# Patient Record
Sex: FEMALE | Race: WHITE | NOT HISPANIC OR LATINO
[De-identification: names, ages, dates, MRNs, and addresses within clinical notes are randomized per-mention and may not be internally consistent; named-entity substitution may affect disease eponyms.]

---

## 2017-01-09 ENCOUNTER — FORM ENCOUNTER (OUTPATIENT)
Age: 27
End: 2017-01-09

## 2017-02-23 ENCOUNTER — FORM ENCOUNTER (OUTPATIENT)
Age: 27
End: 2017-02-23

## 2017-05-27 ENCOUNTER — TRANSCRIPTION ENCOUNTER (OUTPATIENT)
Age: 27
End: 2017-05-27

## 2017-09-25 ENCOUNTER — FORM ENCOUNTER (OUTPATIENT)
Age: 27
End: 2017-09-25

## 2018-03-30 ENCOUNTER — FORM ENCOUNTER (OUTPATIENT)
Age: 28
End: 2018-03-30

## 2019-05-30 ENCOUNTER — RESULT REVIEW (OUTPATIENT)
Age: 29
End: 2019-05-30

## 2020-06-06 ENCOUNTER — RESULT REVIEW (OUTPATIENT)
Age: 30
End: 2020-06-06

## 2020-09-25 ENCOUNTER — APPOINTMENT (OUTPATIENT)
Dept: UROLOGY | Facility: CLINIC | Age: 30
End: 2020-09-25

## 2021-08-02 ENCOUNTER — EMERGENCY (EMERGENCY)
Facility: HOSPITAL | Age: 31
LOS: 1 days | Discharge: HOME | End: 2021-08-02
Admitting: EMERGENCY MEDICINE
Payer: COMMERCIAL

## 2021-08-02 VITALS
TEMPERATURE: 98 F | HEART RATE: 90 BPM | WEIGHT: 173.94 LBS | RESPIRATION RATE: 18 BRPM | OXYGEN SATURATION: 99 % | DIASTOLIC BLOOD PRESSURE: 108 MMHG | SYSTOLIC BLOOD PRESSURE: 146 MMHG

## 2021-08-02 DIAGNOSIS — T31.0 BURNS INVOLVING LESS THAN 10% OF BODY SURFACE: ICD-10-CM
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
## 2021-08-02 DIAGNOSIS — T23.232A BURN OF SECOND DEGREE OF MULTIPLE LEFT FINGERS (NAIL), NOT INCLUDING THUMB, INITIAL ENCOUNTER: ICD-10-CM

## 2021-08-02 DIAGNOSIS — Z23 ENCOUNTER FOR IMMUNIZATION: ICD-10-CM
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
## 2021-08-02 PROCEDURE — 90471 IMMUNIZATION ADMIN: CPT

## 2021-08-02 PROCEDURE — 99284 EMERGENCY DEPT VISIT MOD MDM: CPT

## 2021-08-02 PROCEDURE — 90715 TDAP VACCINE 7 YRS/> IM: CPT

## 2021-08-02 PROCEDURE — 99285 EMERGENCY DEPT VISIT HI MDM: CPT | Mod: 25

## 2021-08-02 RX ORDER — TETANUS TOXOID, REDUCED DIPHTHERIA TOXOID AND ACELLULAR PERTUSSIS VACCINE, ADSORBED 5; 2.5; 8; 8; 2.5 [IU]/.5ML; [IU]/.5ML; UG/.5ML; UG/.5ML; UG/.5ML
0.5 SUSPENSION INTRAMUSCULAR ONCE
Refills: 0 | Status: COMPLETED | OUTPATIENT
Start: 2021-08-02 | End: 2021-08-02

## 2021-08-02 RX ADMIN — TETANUS TOXOID, REDUCED DIPHTHERIA TOXOID AND ACELLULAR PERTUSSIS VACCINE, ADSORBED 0.5 MILLILITER(S): 5; 2.5; 8; 8; 2.5 SUSPENSION INTRAMUSCULAR at 23:18

## 2021-08-02 NOTE — ED PROVIDER NOTE - OBJECTIVE STATEMENT
30 F pmh MS referred from  w/ burns to L hand after picking up hot cast iron pan.  reports pain/redness over palm and fingers, went to  and has dressing placed, given 800 mg motrin and sent to ED because of pain.  Since arriving to ED reports pain improved.  Denies f/c, numbness/weakness, paresthesia, limited ROM hand/fingers, other injuries  tetanus 10 years ago.

## 2021-08-02 NOTE — ED PROVIDER NOTE - NSFOLLOWUPINSTRUCTIONS_ED_ALL_ED_FT
Take tylenol 650mg or motrin 400-800mg as needed every 4-6 hours for pain.     Change dressing daily as instructed.   Call to make appointment with burn clinic at [place].      return to ED if you have fever, discharge from wounds, significant swelling, severe pain or other concerns       Superficial Burn    WHAT YOU NEED TO KNOW:    A superficial burn, or first-degree burn, is a burn that affects only the outer layer of skin. The skin may be red, dry, or tender. The area may swell or turn white when touched.    DISCHARGE INSTRUCTIONS:    Call your local emergency number (911 in the [place]) if:   •You have trouble breathing.          Return to the emergency department if:   •You have increased redness, numbness, or swelling in the burn area.      •You have red streaks or blisters spreading outward from the burn area.      •Your pain is not relieved, or is getting worse even after you take medicine.      Call your doctor if:   •You have a fever.      •You have questions or concerns about your condition or care.      Medicines:   •NSAIDs, such as ibuprofen, help decrease swelling, pain, and fever. NSAIDs can cause stomach bleeding or kidney problems in certain people. If you take blood thinner medicine, always ask your healthcare provider if NSAIDs are safe for you. Always read the medicine label and follow directions.      •Acetaminophen decreases pain and fever. It is available without a doctor's order. Ask how much to take and how often to take it. Follow directions. Read the labels of all other medicines you are using to see if they also contain acetaminophen, or ask your doctor or pharmacist. Acetaminophen can cause liver damage if not taken correctly. Do not use more than 4 grams (4,000 milligrams) total of acetaminophen in one day.       •Take your medicine as directed. Contact your healthcare provider if you think your medicine is not helping or if you have side effects. Tell him of her if you are allergic to any medicine. Keep a list of the medicines, vitamins, and herbs you take. Include the amounts, and when and why you take them. Bring the list or the pill bottles to follow-up visits. Carry your medicine list with you in case of an emergency.      First aid for a superficial burn: A superficial burn usually heals in 5 to 7 days without scarring or blisters. Use the following first-aid guide to treat your burn:  •Remove clothing and jewelry from the burn area immediately.      •Flush liquid chemicals from your skin completely with large amounts of cool running water. Do not splash the chemicals into your eyes.      •Brush dry chemicals off your skin if large amounts of water are not available. Small amounts of water will activate some chemicals and cause more damage.       •Run cool or cold water over the burned area for 10 minutes. A cold or cool compress can also be put on the burn. Do not use ice or ice water on the affected area. Ice may cause more skin damage.      •Soothe the skin with an antibacterial ointment or skin cream, such as aloe vera cream. Do not use butter, petroleum jelly, or other home remedies.      •Do not put a bandage on the burn until you are told to do so by your healthcare provider.      Prevent superficial burns:   •Do not leave cups, mugs, or bowls containing hot liquids at the edge of a table. Keep pot handles turned away from the stove front.      •Do not leave a lit cigarette. Make sure it is no longer lit. Then dispose of it safely.      •Store dangerous items out of the reach of children. Store cigarette lighters, matches, and chemicals where children cannot reach them. Use child safety latches on the door of the safe storage area.  Common Childproofing Latches            •Keep your water heater setting to low or medium (90°F to 120°F, or 32°C to 48°C).      •Wear sunscreen that has a sun protectant factor (SPF) of 15 or higher. The sunscreen should also have ultraviolet A (UVA) and ultraviolet B (UVB) protection. Follow the directions on the label when you use sunscreen. Put on more sunscreen if you are in the sun for more than an hour. Reapply sunscreen often if you go swimming or are sweating.      Follow up with your doctor as directed: Write down your questions so you remember to ask them during your visits.

## 2021-08-02 NOTE — ED ADULT NURSE NOTE - OBJECTIVE STATEMENT
pt. presents with c/o burns to the 2d, 3d and 4th digits on the Rt hand. pt. states she grabbed a hot tray, pt. arrived from urgent care where she was treated with silverdine cream and 800 mg of ibuprofen. the hand is covered with clean gauze.

## 2021-08-02 NOTE — ED PROVIDER NOTE - PATIENT PORTAL LINK FT
You can access the FollowMyHealth Patient Portal offered by [facility] by registering at the following website: [email or web address]. By joining [facility]’s FollowMyHealth portal, you will also be able to view your health information using other applications (apps) compatible with our system.

## 2021-08-02 NOTE — ED PROVIDER NOTE - CLINICAL SUMMARY MEDICAL DECISION MAKING FREE TEXT BOX
30 F pmh MS referred from  w/ burns to L hand after picking up hot cast iron pan.  on exam L hand: blanchable erythema to distal aspect palm and 2nd/3rd digit, blister to palmar aspect 3rd digit, minimally ttp, no open wounds, cap refill intact, SILT, FROM all digits at MCP/PIP/DIP.  tetanus updated.  dressing applied, educated on wound care and f/u at [place] burn [facility].  discussed strict return parameters

## 2021-08-02 NOTE — ED PROVIDER NOTE - PHYSICAL EXAMINATION
Gen: well appearing, no acute distress  Skin: warm/dry  Resp: breathing comfortably, speaking in full sentences, no dyspnea  L hand: blanchable erythema to distal aspect palm and 2nd/3rd digit, blister to palmar aspect 3rd digit, minimally ttp, no open wounds, cap refill intact, SILT, FROM all digits at MCP/PIP/DIP  Neuro: alert/oriented, ambulatory

## 2021-08-12 PROBLEM — Z78.9 OTHER SPECIFIED HEALTH STATUS: Chronic | Status: ACTIVE | Noted: 2021-08-02

## 2021-09-16 ENCOUNTER — APPOINTMENT (OUTPATIENT)
Dept: RHEUMATOLOGY | Facility: CLINIC | Age: 31
End: 2021-09-16
Payer: COMMERCIAL

## 2021-09-16 VITALS
OXYGEN SATURATION: 97 % | HEIGHT: 62 IN | SYSTOLIC BLOOD PRESSURE: 129 MMHG | TEMPERATURE: 98.9 F | HEART RATE: 105 BPM | WEIGHT: 175 LBS | DIASTOLIC BLOOD PRESSURE: 84 MMHG | BODY MASS INDEX: 32.2 KG/M2

## 2021-09-16 DIAGNOSIS — H04.123 DRY EYE SYNDROME OF BILATERAL LACRIMAL GLANDS: ICD-10-CM

## 2021-09-16 PROCEDURE — 99203 OFFICE O/P NEW LOW 30 MIN: CPT

## 2021-09-17 ENCOUNTER — TRANSCRIPTION ENCOUNTER (OUTPATIENT)
Age: 31
End: 2021-09-17

## 2021-09-20 NOTE — DATA REVIEWED
[FreeTextEntry1] : 2021:\par SSa and SSb negative\par \par \par Labs from 2008:\par LENI neg\par RF neg\par CCP neg\par B27 negative\par

## 2021-09-20 NOTE — ASSESSMENT
[FreeTextEntry1] : 31 year old woman referred for rheumatology evaluation. Patient with longstanding history of fibromyalgia diagnosed at the age of 13, now presents secondary to dry eyes.  Patient is currently following with ophthalmologist but feels symptoms are persistent and affecting her daily function due to the pain.  Patient is also having difficulty working as well as feels symptoms worsen with prolonged exposure to the computer as well.  .Discussed conservative measures for dry eyes, previously without benefit.  Patient would like to see a dry eye specialist and a referral was placed for patient. SSa and SSb negative. Discussed role of humidifier, gel at night, and exposure to smoke, wind and dry climates can increase tear evaporation resulting in dry eye symptoms. Failure to blink regularly, such as when staring at a computer screen for long periods of time, can also contribute to drying of the eyes.  Plantar fasciitis improving with PT and change in sneakers, will continue for now and follow up with podiatrist as well.

## 2021-09-20 NOTE — REVIEW OF SYSTEMS
[Dry Eyes] : dryness of the eyes [Eyes Itch] : itching of the eyes [Arthralgias] : arthralgias [Negative] : Heme/Lymph

## 2021-09-20 NOTE — HISTORY OF PRESENT ILLNESS
[FreeTextEntry1] : 31 year old woman self referred for evaluation\par Diagnosed with fibromyalgia at the age of 13\par \par Patient's major concern related to dry eye symptoms.  \par Patient with visual issues when younger\par Wears glasses and follow up with ophthalmologist pending\par \par Also with pains in different parts of body, but feels able to manage\par GERD/ hiatal hernia\par \par Also with history of:\par Interstitial cystits\par IBS\par Everything is not active at all times\par Pelvic floor dysfunction\par \par Pain in feet for past several months, diagnosed with plantar fasciitis, feeling improvement with PT, new sneakers, seeing podiatrist\par \par Major concern at this time: dry eyes and pain in the feet\par MRI feet, plantar fasciitis, getting better with PT\par April-May felt coffee grinds blew into eyes, went to ER, no foreign bodies, eyes have felt irritated since that time\par Went to [facility], tried steroid eye drops, had burning afterward, could not tolerate\par changed to tobradex, with some benefit\par Saw an allergist, diagnosed with pollen allergy\par Now on pataday\par Tried restasis but also burned and could not tolerate\par \par Was seen by endocrinologist, history of PCOS\par \par cymbalta 30 mg (2016)\par OCP\par refresh plus\par \par Tried computer glasses\par Eyes feels scratchy and gritty in the morning\par Has punctal plugs with minimal benefit as well\par Has not tried humidifier or eye mask at night\par Has not yet tried gel at night\par \par Labs reviewed:\par SSa and SSb negative

## 2021-09-20 NOTE — PHYSICAL EXAM
[General Appearance - Alert] : alert [General Appearance - In No Acute Distress] : in no acute distress [General Appearance - Well-Appearing] : healthy appearing [Sclera] : the sclera and conjunctiva were normal [] : no respiratory distress [Respiration, Rhythm And Depth] : normal respiratory rhythm and effort [Exaggerated Use Of Accessory Muscles For Inspiration] : no accessory muscle use [Abnormal Walk] : normal gait [Nail Clubbing] : no clubbing  or cyanosis of the fingernails [Musculoskeletal - Swelling] : no joint swelling seen [Oriented To Time, Place, And Person] : oriented to person, place, and time [Impaired Insight] : insight and judgment were intact [Affect] : the affect was normal [FreeTextEntry1] : no scleral erythema

## 2022-11-02 DIAGNOSIS — Z83.3 FAMILY HISTORY OF DIABETES MELLITUS: ICD-10-CM

## 2022-11-02 DIAGNOSIS — S93.402A SPRAIN OF UNSPECIFIED LIGAMENT OF LEFT ANKLE, INITIAL ENCOUNTER: ICD-10-CM

## 2022-11-02 DIAGNOSIS — Z82.49 FAMILY HISTORY OF ISCHEMIC HEART DISEASE AND OTHER DISEASES OF THE CIRCULATORY SYSTEM: ICD-10-CM

## 2022-11-02 DIAGNOSIS — I99.8 OTHER DISORDER OF CIRCULATORY SYSTEM: ICD-10-CM

## 2022-11-07 ENCOUNTER — APPOINTMENT (OUTPATIENT)
Dept: PODIATRY | Facility: CLINIC | Age: 32
End: 2022-11-07

## 2022-11-07 DIAGNOSIS — S90.31XA CONTUSION OF RIGHT FOOT, INITIAL ENCOUNTER: ICD-10-CM

## 2022-11-07 PROCEDURE — 73630 X-RAY EXAM OF FOOT: CPT | Mod: RT

## 2022-11-07 PROCEDURE — 99213 OFFICE O/P EST LOW 20 MIN: CPT | Mod: 25

## 2022-11-11 PROBLEM — S90.31XA CONTUSION OF RIGHT FOOT, INITIAL ENCOUNTER: Status: ACTIVE | Noted: 2022-11-08

## 2022-11-11 NOTE — PHYSICAL EXAM
[2+] : left foot dorsalis pedis 2+ [Sensation] : the sensory exam was normal to light touch and pinprick [No Focal Deficits] : no focal deficits [Deep Tendon Reflexes (DTR)] : deep tendon reflexes were 2+ and symmetric [Motor Exam] : the motor exam was normal [Ankle Swelling (On Exam)] : not present [Varicose Veins Of Lower Extremities] : not present [Delayed in the Right Toes] : capillary refills normal in right toes [Delayed in the Left Toes] : capillary refills normal in the left toes [FreeTextEntry3] : Hair growth noted on digits. Proximal to distal cooling is within normal limits.  [de-identified] : Some sensitivity at the instep from contusion but no signs of bruising. There is good stability.  All tendons are noted to be intact. [FreeTextEntry1] : She states that she has swelling. It looks like it is essentially healing in.There is no bruising.

## 2022-11-11 NOTE — PROCEDURE
[FreeTextEntry1] : X-ray Report: (Right foot - 3 views) X-rays demonstrate cavus foot. No sign of fracture, arthrosis or other irregularity.

## 2022-11-11 NOTE — ASSESSMENT
[FreeTextEntry1] : \par Impression: Contusion, right foot. Plantar fasciitis that is really minimally symptomatic.\par \par Treatment: I gave her a series of stretching exercises. Her shoes are excellent. I want her to ice the contusion for 15 minutes once a day for another week and then gradually increase activity. She is doing physical therapy because of fibromyalgia.

## 2022-11-11 NOTE — HISTORY OF PRESENT ILLNESS
[Sneakers] : kevin [FreeTextEntry1] : Patient presents today for evaluation.S he dropped a vacuum on her right foot last week. It was painful   She feels that it is better and now the pain is more of a 3/10. She also has chronic plantar fasciitis.

## 2023-02-12 ENCOUNTER — NON-APPOINTMENT (OUTPATIENT)
Age: 33
End: 2023-02-12

## 2023-02-13 ENCOUNTER — APPOINTMENT (OUTPATIENT)
Dept: NEUROSURGERY | Facility: CLINIC | Age: 33
End: 2023-02-13
Payer: OTHER MISCELLANEOUS

## 2023-02-13 VITALS
BODY MASS INDEX: 33.13 KG/M2 | HEIGHT: 62 IN | SYSTOLIC BLOOD PRESSURE: 140 MMHG | OXYGEN SATURATION: 97 % | WEIGHT: 180 LBS | DIASTOLIC BLOOD PRESSURE: 83 MMHG | HEART RATE: 89 BPM

## 2023-02-13 DIAGNOSIS — S06.0X0A CONCUSSION W/OUT LOSS OF CONSCIOUSNESS, INITIAL ENCOUNTER: ICD-10-CM

## 2023-02-13 PROCEDURE — 99072 ADDL SUPL MATRL&STAF TM PHE: CPT

## 2023-02-13 PROCEDURE — 99205 OFFICE O/P NEW HI 60 MIN: CPT

## 2023-02-16 ENCOUNTER — NON-APPOINTMENT (OUTPATIENT)
Age: 33
End: 2023-02-16

## 2023-02-16 NOTE — REASON FOR VISIT
[New Patient Visit] : a new patient visit [Family Member] : family member [FreeTextEntry1] : concussion

## 2023-02-16 NOTE — ASSESSMENT
[FreeTextEntry1] : IMPRESSION:\par \par 32 yrs old female with PMH of anxiety, depression and fibromyalgia on Cymbalta with a work related injury on 8/23/22 at work. Pt had a head injury after a window shield fell on her head. Pt had  LOC for a second. Pt was seen in the ER in the [facility] where she was told that she has concussion, there was no images taken in the ER. She was seen again by the [facility] MD with headache and dizziness and  was out of work for 2 weeks, and now  back to regular FT work, but continued with her symptoms. Pt had a CT Head ([facility] radiology) is WN. She is under treatment  by neurologist for post concussion symptoms of headache and neck pain She was given a Topamax prescription but she did not take the medication. Pt has prolonged concussion syndrome.  Pt has significant emotional component affecting her concussion symptoms, needs psychological and psychiatric consultation. She was tearful in office as she expressed frustration about not knowing what to do about her condition.  She is partly disabled at this time and requires significant work accommodations at the very least.  Pt states there is no space or ability to take breaks during the day.\par \par PLAN:\par \par Tab Vitamine B2 400 mg daily\par Tab Magnesium 400 mg Daily\par STARS concussion program with VT and [place] treadmill and aerobic exercises 2-3 times/week x 8 weeks. \par Continue PT for neck / head as recommended by neurologist\par Advised Psychological counselling and psychiatric consultation, resources given for finding providers \par Encouraged aerobic exercises \par Encouraged pt to take Topamax, starting at 25 mg once a day, then bid after a week, but pt reluctant.  We can see how the supplements work.\par Pt may be better served to take 4 weeks off of work to concentrate on her recovery and remove her from stimuli at work that are exacerbating her symptoms with a graduated return to work schedule, if allowed by her employer.  She is concerned about her insurance coverage.  We talked about FMLA and encouraged pt to talk with her HR office. Letter for work for time off given to pt and she will decide as to whether she will use it. \par F/U in 3 weeks

## 2023-02-16 NOTE — PHYSICAL EXAM
[General Appearance - Alert] : alert [General Appearance - In No Acute Distress] : in no acute distress [General Appearance - Well Nourished] : well nourished [General Appearance - Well-Appearing] : healthy appearing [Oriented To Time, Place, And Person] : oriented to person, place, and time [Affect] : the affect was normal [Memory Recent] : recent memory was not impaired [Person] : oriented to person [Place] : oriented to place [Cranial Nerves Optic (II)] : visual acuity intact bilaterally,  pupils equal round and reactive to light [Cranial Nerves Oculomotor (III)] : extraocular motion intact [Cranial Nerves Trigeminal (V)] : facial sensation intact symmetrically [Cranial Nerves Facial (VII)] : face symmetrical [Cranial Nerves Vestibulocochlear (VIII)] : hearing was intact bilaterally [Cranial Nerves Accessory (XI - Cranial And Spinal)] : head turning and shoulder shrug symmetric [Cranial Nerves Hypoglossal (XII)] : there was no tongue deviation with protrusion [Motor Tone] : muscle tone was normal in all four extremities [Motor Strength] : muscle strength was normal in all four extremities [Sensation Tactile Decrease] : light touch was intact [Sensation Pain / Temperature Decrease] : pain and temperature was intact [Balance] : balance was intact [Sclera] : the sclera and conjunctiva were normal [PERRL With Normal Accommodation] : pupils were equal in size, round, reactive to light, with normal accommodation [Outer Ear] : the ears and nose were normal in appearance [Both Tympanic Membranes Were Examined] : both tympanic membranes were normal [Neck Appearance] : the appearance of the neck was normal [] : no respiratory distress [Respiration, Rhythm And Depth] : normal respiratory rhythm and effort [Apical Impulse] : the apical impulse was normal [Heart Rate And Rhythm] : heart rate was normal and rhythm regular [Arterial Pulses Carotid] : carotid pulses were normal with no bruits [Abdominal Aorta] : the abdominal aorta was normal [No CVA Tenderness] : no ~M costovertebral angle tenderness [No Spinal Tenderness] : no spinal tenderness [Abnormal Walk] : normal gait [Involuntary Movements] : no involuntary movements were seen [Skin Color & Pigmentation] : normal skin color and pigmentation [FreeTextEntry8] : no drift, no imbalance at tandem walking or eyes open.  Mild imbalance with tandem stance eyes closed. [No Tenderness to Palpation] : no spine tenderness on palpation [FreeTextEntry3] : No paraspinal spasm, restricted ROM in extreme of left lateral rotation

## 2023-02-16 NOTE — HISTORY OF PRESENT ILLNESS
[> 3 months] : more  than 3 months [FreeTextEntry1] : concussion [de-identified] : 32 yrs old female with PMH of anxiety, depression and fibromyalgia on Cymbalta is here for consultation for a concussion.  On 8/23/22 at work, pt had a head injury after a  window shield fell on her head. Pt had a LOC  for a second. Pt was seen in the ER in the [facility] where she was told that she has concussion, there was no images taken in the ER. She was seen again by the [facility] MD with headache and dizziness and was sent back to ER. She was out of work for 2 weeks, was back to regular FT work, but continued with her symptoms. Pt had a  CT Head was done in [address] radiology. She is under treatment  by neurologist for post concussion symptoms like headache, ordered Topamax and PT. \par \par Today she is here with her mother, reports headache constant 4-5/10, she was not taken Topamax to try if it help for headache as doesn't think she has migraine. She experience  Eye strain for that she follows up with optometrist , now symptoms are worsened and doing eye exercises. She also report her symptoms get worsen with work stress and domestic issues. She feel like she has no time to get rest or work on her symptoms with her full-time work and stressed daily routines. Her Headache are not going away and feels dizzy always. pt is emotional with what is going on after the injury.  Pt works as a behavioral health  specialist.  \par \par PCSS concussion score: 49\par 6:headache\par 5:drowsiness, more emotional, sadness\par 4:sensitivity to light, sleeping more than usual, feeling slowed down, difficulty with concentration, difficulty with remembering\par 3:feeling like in a fog\par 2:sensitivity to noise, dizziness\par 1: trouble falling asleep

## 2023-02-16 NOTE — REVIEW OF SYSTEMS
[Decr. Concentrating Ability] : decreased concentrating ability [Dizziness] : dizziness [Cluster Headache] : cluster headaches [Sleep Disturbances] : sleep disturbances [Anxiety] : anxiety [Depression] : depression [Change In Personality] : personality change [Emotional Problems] : emotional problems [Negative] : Heme/Lymph [Changed Thought Patterns] : no change in thought patterns [Loss Of Hearing] : no hearing loss [FreeTextEntry4] : noise sensitivity

## 2023-02-16 NOTE — RESULTS/DATA
[FreeTextEntry1] : CT MSR 9/1/2022:  essentially wnl. Azathioprine Pregnancy And Lactation Text: This medication is Pregnancy Category D and isn't considered safe during pregnancy. It is unknown if this medication is excreted in breast milk.

## 2023-04-03 ENCOUNTER — APPOINTMENT (OUTPATIENT)
Dept: NEUROSURGERY | Facility: CLINIC | Age: 33
End: 2023-04-03
Payer: OTHER MISCELLANEOUS

## 2023-04-03 VITALS
OXYGEN SATURATION: 98 % | SYSTOLIC BLOOD PRESSURE: 135 MMHG | WEIGHT: 180 LBS | HEIGHT: 62 IN | DIASTOLIC BLOOD PRESSURE: 88 MMHG | HEART RATE: 104 BPM | BODY MASS INDEX: 33.13 KG/M2

## 2023-04-03 PROCEDURE — 99214 OFFICE O/P EST MOD 30 MIN: CPT

## 2023-04-16 NOTE — ASSESSMENT
[FreeTextEntry1] : IMPRESSION:\par \par 32 yrs old female with PMH of anxiety, depression and fibromyalgia on Cymbalta with a work related injury on 8/23/22 at work. Pt had a head injury after a window shield fell on her head. Pt had LOC for a second. Pt was seen in the ER in the [facility] where she was told that she has concussion, there was no images taken in the ER. She was seen again by the [facility] MD with headache and dizziness and was out of work for 2 weeks initially and back to work later on. Pt got worsened with her symptoms with the FT work . Last visit she was signed  off from work for therapy and counselling. Pt is getting better with symptoms ,but just got worse after the paper work for this encounter.  Pt has significant emotional component affecting her concussion symptoms, needs to continue psychological and psychiatric care. She is frustrated with the work situation and worried that if she takes more time off, she will lose her job.  However, she is concerned about going back to work wt this time.  PCSS are plateaued today.  \par \par PLAN:\par \par Continue Tab Vitamine B2 400 mg daily\par Continue Tab Magnesium 400 mg Daily\par Encouraged aerobic exercises \par STARS concussion program with VT and [employer] treadmill and aerobic exercises 2-3 times/week x 8 weeks. \par Continue Psychological counselling and psychiatric consultation\par Work letter given for  being work until next evaluation, may go back to part time if feels ok, pt may call for the letter discussing graduated return to work.\par F/U in 3 weeks.

## 2023-04-16 NOTE — HISTORY OF PRESENT ILLNESS
[FreeTextEntry1] : 32 yrs old female with PMH of anxiety, depression and fibromyalgia on Cymbalta is here for consultation for a concussion. On 8/23/22 at work, pt had a head injury after a window shield fell on her head. Pt had a LOC for a second. Pt was seen in the ER in the [facility] where she was told that she has concussion, there was no images taken in the ER. She was seen again by the [facility] MD with headache and dizziness and was sent back to ER. She was out of work for 2 weeks, was back to regular FT work, but continued with her symptoms. Pt had a CT Head was done in [address] radiology. She is under treatment by neurologist for post concussion symptoms like headache, ordered Topamax and PT. \par \par Today she is back with her mother, reports headache is better and is getting better with her other symptoms. She is  Off Topamax,  she is feeling well after that , it was making her very dizzy.  She also report Difficultly  with movement. VT is helping.   She is doing bike at adicate timeads. She is seeing nutritionist , lost few lbs weight. Pt had seen PCP who did blood work. reportedly normal. Pt also has psychologist and psychiatrist , but she does not like them.  Pt found that the optic nerve inflamed, to be seen by neuro ophthalmologist.  Pt works as a behavioral health specialist. She is very nervous/anxious after filling out the PCSS form.\par \par PCSS Concussion Score: 52 (49)\par 6: none\par 5: none\par 4: none \par 3: headache, nausea, sensitivity to light, sensitivity to noise, dizziness, balance problems, trouble falling asleep, sleeping more than usual, drowsiness, more emotional than usual, irritability, sadness, nervousness, feeling like in a 'fog', difficulty with concentrating, difficulty with remembering.\par 2: numbness or tingling, feeling slowed down\par 1: none \par \par level of activity- 6\par \par NW Concussion Score: 14 \par A lot:- nervous or anxious\par A little:- headaches, sensitivity to light, sensitivity to noise, neck pain, dizziness or light headed, unbalanced, hard to fall asleep, more tired than usual, sleeping more than usual, sad, feel like in a "fog", difficulty concentrating/ remembering.\par None:-\par \par level of activity- 6

## 2023-04-16 NOTE — PHYSICAL EXAM
[General Appearance - Alert] : alert [General Appearance - In No Acute Distress] : in no acute distress [General Appearance - Well Nourished] : well nourished [General Appearance - Well-Appearing] : healthy appearing [Oriented To Time, Place, And Person] : oriented to person, place, and time [Impaired Insight] : insight and judgment were intact [Affect] : the affect was normal [Memory Recent] : recent memory was not impaired [Person] : oriented to person [Place] : oriented to place [Time] : oriented to time [Short Term Intact] : short term memory intact [Cranial Nerves Optic (II)] : visual acuity intact bilaterally,  pupils equal round and reactive to light [Cranial Nerves Oculomotor (III)] : extraocular motion intact [Cranial Nerves Trigeminal (V)] : facial sensation intact symmetrically [Cranial Nerves Facial (VII)] : face symmetrical [Cranial Nerves Vestibulocochlear (VIII)] : hearing was intact bilaterally [Cranial Nerves Accessory (XI - Cranial And Spinal)] : head turning and shoulder shrug symmetric [Cranial Nerves Hypoglossal (XII)] : there was no tongue deviation with protrusion [Motor Tone] : muscle tone was normal in all four extremities [Motor Strength] : muscle strength was normal in all four extremities [Involuntary Movements] : no involuntary movements were seen [Balance] : balance was intact [Sclera] : the sclera and conjunctiva were normal [PERRL With Normal Accommodation] : pupils were equal in size, round, reactive to light, with normal accommodation [Outer Ear] : the ears and nose were normal in appearance [Both Tympanic Membranes Were Examined] : both tympanic membranes were normal [Neck Appearance] : the appearance of the neck was normal [] : no respiratory distress [Respiration, Rhythm And Depth] : normal respiratory rhythm and effort [Apical Impulse] : the apical impulse was normal [Arterial Pulses Carotid] : carotid pulses were normal with no bruits [Abdominal Aorta] : the abdominal aorta was normal [Bowel Sounds] : normal bowel sounds [Abdomen Soft] : soft [No CVA Tenderness] : no ~M costovertebral angle tenderness [No Spinal Tenderness] : no spinal tenderness [Abnormal Walk] : normal gait [Musculoskeletal - Swelling] : no joint swelling seen [Skin Color & Pigmentation] : normal skin color and pigmentation [FreeTextEntry8] : no drift, no imbalance at tandem

## 2023-05-22 ENCOUNTER — APPOINTMENT (OUTPATIENT)
Dept: NEUROSURGERY | Facility: CLINIC | Age: 33
End: 2023-05-22

## 2023-07-24 ENCOUNTER — APPOINTMENT (OUTPATIENT)
Dept: PODIATRY | Facility: CLINIC | Age: 33
End: 2023-07-24
Payer: COMMERCIAL

## 2023-07-24 DIAGNOSIS — T14.8XXA OTHER INJURY OF UNSPECIFIED BODY REGION, INITIAL ENCOUNTER: ICD-10-CM

## 2023-07-24 DIAGNOSIS — M20.40 OTHER HAMMER TOE(S) (ACQUIRED), UNSPECIFIED FOOT: ICD-10-CM

## 2023-07-24 DIAGNOSIS — M79.672 PAIN IN RIGHT FOOT: ICD-10-CM

## 2023-07-24 DIAGNOSIS — M79.671 PAIN IN RIGHT FOOT: ICD-10-CM

## 2023-07-24 PROCEDURE — 99213 OFFICE O/P EST LOW 20 MIN: CPT

## 2023-07-26 PROBLEM — M20.40 HAMMERTOE: Status: ACTIVE | Noted: 2023-07-25

## 2023-07-26 PROBLEM — T14.8XXA BLISTER: Status: ACTIVE | Noted: 2023-07-25

## 2023-07-26 PROBLEM — M79.671 PAIN IN BOTH FEET: Status: ACTIVE | Noted: 2023-07-25

## 2023-07-26 NOTE — HISTORY OF PRESENT ILLNESS
[Sneakers] : kevin [FreeTextEntry1] : Patient presents today as an emergency with mom. She has blisters. Many of them popped the right 5th she felt was getting infected. It is painful at the left heel. She got shoe bites from wearing inappropriate shoes.

## 2023-07-26 NOTE — ASSESSMENT
[FreeTextEntry1] : \par Impression: Pain. Blister. Hammertoe deformity. \par \par Treatment: The right 5th toe I debrided, deroofed and drained it. There is no infection that needs anything oral. The left heel I prepped with alcohol also, drained and put a band-aid over this area. I put a Silvadene dressing on the right 5th toe that she will leave on for 24 hours and then I want her to use the Mupirocin and open toe shoes for the next week. She needs to throw out the shoes she has. With any worsening or infection she will call the office. I recommend using Aquaphor for the irritated 5th hammertoes to avoid problems in the future.

## 2023-07-26 NOTE — PHYSICAL EXAM
[2+] : left foot dorsalis pedis 2+ [Sensation] : the sensory exam was normal to light touch and pinprick [No Focal Deficits] : no focal deficits [Deep Tendon Reflexes (DTR)] : deep tendon reflexes were 2+ and symmetric [Motor Exam] : the motor exam was normal [Ankle Swelling (On Exam)] : not present [Varicose Veins Of Lower Extremities] : not present [Delayed in the Right Toes] : capillary refills normal in right toes [Delayed in the Left Toes] : capillary refills normal in the left toes [de-identified] : She has 5th hammertoe deformity bilateral which is making her more prone to irritation in this area. There are no signs of infection.  [FreeTextEntry3] : Hair growth noted on digits. Proximal to distal cooling is within normal limits.  [FreeTextEntry1] : She has blister irritation at the bilateral retrocalcaneal area. At the left side there is a full blister that is sensitive at the heel medially. There is bunion bites with small blisters, left 5th toe blister and the right is inflamed and irritated. She went to [facility] and she has been using Mupirocin ointment.

## 2023-08-10 ENCOUNTER — APPOINTMENT (OUTPATIENT)
Dept: NEUROLOGY | Facility: CLINIC | Age: 33
End: 2023-08-10

## 2023-09-08 ENCOUNTER — APPOINTMENT (OUTPATIENT)
Dept: PAIN MANAGEMENT | Facility: CLINIC | Age: 33
End: 2023-09-08
Payer: COMMERCIAL

## 2023-09-08 VITALS
BODY MASS INDEX: 33.13 KG/M2 | WEIGHT: 180 LBS | HEART RATE: 76 BPM | HEIGHT: 62 IN | SYSTOLIC BLOOD PRESSURE: 117 MMHG | DIASTOLIC BLOOD PRESSURE: 85 MMHG

## 2023-09-08 DIAGNOSIS — M79.7 FIBROMYALGIA: ICD-10-CM

## 2023-09-08 DIAGNOSIS — G43.709 CHRONIC MIGRAINE W/OUT AURA, NOT INTRACTABLE, W/OUT STATUS MIGRAINOSUS: ICD-10-CM

## 2023-09-08 PROCEDURE — 99204 OFFICE O/P NEW MOD 45 MIN: CPT

## 2023-09-08 RX ORDER — NABUMETONE 750 MG/1
750 TABLET, FILM COATED ORAL
Qty: 45 | Refills: 4 | Status: ACTIVE | COMMUNITY
Start: 2023-09-08 | End: 1900-01-01

## 2023-09-08 NOTE — HISTORY OF PRESENT ILLNESS
[FreeTextEntry1] : Pt notes that she has been under severe stressors.  REviews her history of concussion 1 year ago and subsequent diagnosis of IIH.  had been on acetazolamide and told her papilledema is improved. Feels presured at work and is planning leaving from that role.  Notes her headacehs have affected personal and professional life. Is still having periods of worsening pain where she remains disabled despite advil. Does have some sensitivity to light and noise.  Had nausea in past  Does note fibromyalgia, PCOS, anxiety/ depression and OCD. 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 Now taking mg, d, fish oil ,iron, Cymbalta 40mg 2019. TO have sleep study done. Takes probiotic for IBS. Was on Diamox form April and came off in August.  Had been seeing Dr. Renny Santos Previously on Elavil. Was on lamictal, lexapor, modafanil, buproprion, klnopin, lexapro in 2015.  Abilify by 2018.   Tried topirimate but made her sick and dizzy but felt slightly better with it.  "felt seasick lying down on a boat."

## 2023-09-08 NOTE — ASSESSMENT
[FreeTextEntry1] : To start [facility] physical therapy [facility] change in psychaitric medicaiton to Effexor, Pristiq trial of nabuemtone

## 2023-09-13 RX ORDER — GALCANEZUMAB 120 MG/ML
120 INJECTION, SOLUTION SUBCUTANEOUS
Qty: 1 | Refills: 5 | Status: ACTIVE | COMMUNITY
Start: 2023-09-11 | End: 1900-01-01

## 2023-09-14 RX ORDER — GALCANEZUMAB 120 MG/ML
120 INJECTION, SOLUTION SUBCUTANEOUS
Qty: 1 | Refills: 3 | Status: ACTIVE | COMMUNITY
Start: 2023-09-11 | End: 1900-01-01

## 2023-11-14 ENCOUNTER — APPOINTMENT (OUTPATIENT)
Dept: PAIN MANAGEMENT | Facility: CLINIC | Age: 33
End: 2023-11-14

## 2023-11-30 ENCOUNTER — APPOINTMENT (OUTPATIENT)
Dept: PAIN MANAGEMENT | Facility: CLINIC | Age: 33
End: 2023-11-30
Payer: COMMERCIAL

## 2023-11-30 PROCEDURE — 99212 OFFICE O/P EST SF 10 MIN: CPT | Mod: 95

## 2023-12-01 ENCOUNTER — APPOINTMENT (OUTPATIENT)
Dept: PAIN MANAGEMENT | Facility: CLINIC | Age: 33
End: 2023-12-01

## 2023-12-29 ENCOUNTER — APPOINTMENT (OUTPATIENT)
Dept: PODIATRY | Facility: CLINIC | Age: 33
End: 2023-12-29

## 2024-01-11 ENCOUNTER — APPOINTMENT (OUTPATIENT)
Dept: PAIN MANAGEMENT | Facility: CLINIC | Age: 34
End: 2024-01-11

## 2024-05-08 ENCOUNTER — APPOINTMENT (OUTPATIENT)
Dept: PODIATRY | Facility: CLINIC | Age: 34
End: 2024-05-08

## 2024-05-08 ENCOUNTER — EMERGENCY (EMERGENCY)
Facility: HOSPITAL | Age: 34
LOS: 1 days | Discharge: HOME | End: 2024-05-08
Attending: EMERGENCY MEDICINE | Admitting: EMERGENCY MEDICINE
Payer: COMMERCIAL

## 2024-05-08 VITALS
HEART RATE: 89 BPM | OXYGEN SATURATION: 97 % | TEMPERATURE: 98 F | RESPIRATION RATE: 18 BRPM | SYSTOLIC BLOOD PRESSURE: 103 MMHG | DIASTOLIC BLOOD PRESSURE: 71 MMHG

## 2024-05-08 VITALS
RESPIRATION RATE: 17 BRPM | OXYGEN SATURATION: 98 % | DIASTOLIC BLOOD PRESSURE: 64 MMHG | HEART RATE: 76 BPM | SYSTOLIC BLOOD PRESSURE: 110 MMHG | TEMPERATURE: 98 F

## 2024-05-08 LAB
ALBUMIN SERPL ELPH-MCNC: 4 G/DL — SIGNIFICANT CHANGE UP (ref 3.3–5)
ALP SERPL-CCNC: 63 U/L — SIGNIFICANT CHANGE UP (ref 40–120)
ALT FLD-CCNC: 14 U/L — SIGNIFICANT CHANGE UP (ref 4–33)
ANION GAP SERPL CALC-SCNC: 10 MMOL/L — SIGNIFICANT CHANGE UP (ref 7–14)
APPEARANCE UR: ABNORMAL
AST SERPL-CCNC: 18 U/L — SIGNIFICANT CHANGE UP (ref 4–32)
BACTERIA # UR AUTO: ABNORMAL /HPF
BASOPHILS # BLD AUTO: 0.03 K/UL — SIGNIFICANT CHANGE UP (ref 0–0.2)
BASOPHILS NFR BLD AUTO: 0.4 % — SIGNIFICANT CHANGE UP (ref 0–2)
BILIRUB SERPL-MCNC: <0.2 MG/DL — SIGNIFICANT CHANGE UP (ref 0.2–1.2)
BILIRUB UR-MCNC: NEGATIVE — SIGNIFICANT CHANGE UP
BUN SERPL-MCNC: 13 MG/DL — SIGNIFICANT CHANGE UP (ref 7–23)
CALCIUM SERPL-MCNC: 8.8 MG/DL — SIGNIFICANT CHANGE UP (ref 8.4–10.5)
CAST: 0 /LPF — SIGNIFICANT CHANGE UP (ref 0–4)
CHLORIDE SERPL-SCNC: 107 MMOL/L — SIGNIFICANT CHANGE UP (ref 98–107)
CO2 SERPL-SCNC: 22 MMOL/L — SIGNIFICANT CHANGE UP (ref 22–31)
COLOR SPEC: YELLOW — SIGNIFICANT CHANGE UP
CREAT SERPL-MCNC: 0.62 MG/DL — SIGNIFICANT CHANGE UP (ref 0.5–1.3)
DIFF PNL FLD: NEGATIVE — SIGNIFICANT CHANGE UP
EGFR: 121 ML/MIN/1.73M2 — SIGNIFICANT CHANGE UP
EOSINOPHIL # BLD AUTO: 0.05 K/UL — SIGNIFICANT CHANGE UP (ref 0–0.5)
EOSINOPHIL NFR BLD AUTO: 0.6 % — SIGNIFICANT CHANGE UP (ref 0–6)
GLUCOSE SERPL-MCNC: 89 MG/DL — SIGNIFICANT CHANGE UP (ref 70–99)
GLUCOSE UR QL: NEGATIVE MG/DL — SIGNIFICANT CHANGE UP
HCG UR QL: NEGATIVE — SIGNIFICANT CHANGE UP
HCT VFR BLD CALC: 39.5 % — SIGNIFICANT CHANGE UP (ref 34.5–45)
HGB BLD-MCNC: 12.8 G/DL — SIGNIFICANT CHANGE UP (ref 11.5–15.5)
IANC: 4.94 K/UL — SIGNIFICANT CHANGE UP (ref 1.8–7.4)
IMM GRANULOCYTES NFR BLD AUTO: 0.4 % — SIGNIFICANT CHANGE UP (ref 0–0.9)
KETONES UR-MCNC: NEGATIVE MG/DL — SIGNIFICANT CHANGE UP
LEUKOCYTE ESTERASE UR-ACNC: NEGATIVE — SIGNIFICANT CHANGE UP
LIDOCAIN IGE QN: 42 U/L — SIGNIFICANT CHANGE UP (ref 7–60)
LYMPHOCYTES # BLD AUTO: 2.85 K/UL — SIGNIFICANT CHANGE UP (ref 1–3.3)
LYMPHOCYTES # BLD AUTO: 33.5 % — SIGNIFICANT CHANGE UP (ref 13–44)
MCHC RBC-ENTMCNC: 29 PG — SIGNIFICANT CHANGE UP (ref 27–34)
MCHC RBC-ENTMCNC: 32.4 GM/DL — SIGNIFICANT CHANGE UP (ref 32–36)
MCV RBC AUTO: 89.4 FL — SIGNIFICANT CHANGE UP (ref 80–100)
MONOCYTES # BLD AUTO: 0.6 K/UL — SIGNIFICANT CHANGE UP (ref 0–0.9)
MONOCYTES NFR BLD AUTO: 7.1 % — SIGNIFICANT CHANGE UP (ref 2–14)
NEUTROPHILS # BLD AUTO: 4.94 K/UL — SIGNIFICANT CHANGE UP (ref 1.8–7.4)
NEUTROPHILS NFR BLD AUTO: 58 % — SIGNIFICANT CHANGE UP (ref 43–77)
NITRITE UR-MCNC: NEGATIVE — SIGNIFICANT CHANGE UP
NRBC # BLD: 0 /100 WBCS — SIGNIFICANT CHANGE UP (ref 0–0)
NRBC # FLD: 0 K/UL — SIGNIFICANT CHANGE UP (ref 0–0)
PH UR: 7.5 — SIGNIFICANT CHANGE UP (ref 5–8)
PLATELET # BLD AUTO: 304 K/UL — SIGNIFICANT CHANGE UP (ref 150–400)
POTASSIUM SERPL-MCNC: 4.2 MMOL/L — SIGNIFICANT CHANGE UP (ref 3.5–5.3)
POTASSIUM SERPL-SCNC: 4.2 MMOL/L — SIGNIFICANT CHANGE UP (ref 3.5–5.3)
PROT SERPL-MCNC: 6.7 G/DL — SIGNIFICANT CHANGE UP (ref 6–8.3)
PROT UR-MCNC: NEGATIVE MG/DL — SIGNIFICANT CHANGE UP
RBC # BLD: 4.42 M/UL — SIGNIFICANT CHANGE UP (ref 3.8–5.2)
RBC # FLD: 12.4 % — SIGNIFICANT CHANGE UP (ref 10.3–14.5)
RBC CASTS # UR COMP ASSIST: 1 /HPF — SIGNIFICANT CHANGE UP (ref 0–4)
SODIUM SERPL-SCNC: 139 MMOL/L — SIGNIFICANT CHANGE UP (ref 135–145)
SP GR SPEC: 1.02 — SIGNIFICANT CHANGE UP (ref 1–1.03)
SQUAMOUS # UR AUTO: 17 /HPF — HIGH (ref 0–5)
UROBILINOGEN FLD QL: 0.2 MG/DL — SIGNIFICANT CHANGE UP (ref 0.2–1)
WBC # BLD: 8.5 K/UL — SIGNIFICANT CHANGE UP (ref 3.8–10.5)
WBC # FLD AUTO: 8.5 K/UL — SIGNIFICANT CHANGE UP (ref 3.8–10.5)
WBC UR QL: 0 /HPF — SIGNIFICANT CHANGE UP (ref 0–5)

## 2024-05-08 PROCEDURE — 76830 TRANSVAGINAL US NON-OB: CPT | Mod: 26

## 2024-05-08 PROCEDURE — 76770 US EXAM ABDO BACK WALL COMP: CPT | Mod: 26

## 2024-05-08 PROCEDURE — 99284 EMERGENCY DEPT VISIT MOD MDM: CPT

## 2024-05-08 RX ORDER — SODIUM CHLORIDE 9 MG/ML
1000 INJECTION INTRAMUSCULAR; INTRAVENOUS; SUBCUTANEOUS ONCE
Refills: 0 | Status: COMPLETED | OUTPATIENT
Start: 2024-05-08 | End: 2024-05-08

## 2024-05-08 RX ORDER — ONDANSETRON 8 MG/1
4 TABLET, FILM COATED ORAL ONCE
Refills: 0 | Status: DISCONTINUED | OUTPATIENT
Start: 2024-05-08 | End: 2024-05-11

## 2024-05-08 RX ORDER — ACETAMINOPHEN 500 MG
1000 TABLET ORAL ONCE
Refills: 0 | Status: COMPLETED | OUTPATIENT
Start: 2024-05-08 | End: 2024-05-08

## 2024-05-08 RX ADMIN — SODIUM CHLORIDE 1000 MILLILITER(S): 9 INJECTION INTRAMUSCULAR; INTRAVENOUS; SUBCUTANEOUS at 12:51

## 2024-05-08 RX ADMIN — Medication 1000 MILLIGRAM(S): at 15:00

## 2024-05-08 RX ADMIN — Medication 400 MILLIGRAM(S): at 12:51

## 2024-05-08 NOTE — ED PROVIDER NOTE - PATIENT PORTAL LINK FT
You can access the FollowMyHealth Patient Portal offered by [facility] by registering at the following website: [email or web address]. By joining [facility]’s FollowMyHealth portal, you will also be able to view your health information using other applications (apps) compatible with our system.

## 2024-05-08 NOTE — ED ADULT TRIAGE NOTE - CHIEF COMPLAINT QUOTE
patient states" I am having pain to both sides of my back and lower belly since few days and I have  a lot of pressure to urinate ". h/o interstitial cystitis ,started a new medicine prestique for depression. h/o PCOS

## 2024-05-08 NOTE — ED PROVIDER NOTE - CLINICAL SUMMARY MEDICAL DECISION MAKING FREE TEXT BOX
On arrival to the ED, the patient is anxious appearing but nontoxic-appearing.  She is normotensive, nontachycardic, afebrile, and satting 97% on room air.  Abdomen is soft with some suprapubic and left lower quadrant tenderness.  No CVA tenderness bilaterally.  Plan to obtain transvaginal ultrasound as patient is sexually active as well as ultrasound of her kidney, ureter, bladder to evaluate for stones/hydronephrosis.  Will treat symptomatically, obtain labs, and urinalysis.  Patient agreeable with plan.

## 2024-05-08 NOTE — ED PROVIDER NOTE - PHYSICAL EXAMINATION
Constitutional: Well-appearing, well-nourished, in acute distress secondary to pain.   Head: Normocephalic, atraumatic.   Eyes: PERRL. EOMI. No conjunctival pallor.   ENT: Moist mucous membranes. Uvula midline. No pharyngeal erythema or exudates.  Neck: No LAD. Supple.  CVS: Regular rate, regular rhythm. Normal S1, S2. No murmurs, rubs, or gallops. No peripheral edema noted.   Respiratory: No respiratory distress. Clear to auscultation bilaterally. No wheezing, rales, or rhonchi.   Abdomen: Abd is soft and non-distended. suprapubic and LLQ tenderness. No rebound, guarding, or rigidity.   MSK: No CVA tenderness bilaterally.   Neuro: Alert and oriented to person, place, and time. Follows commands. Moves all extremities.   Skin: Warm and dry. No rashes.   Psych: Anxious affect, cooperative.

## 2024-05-08 NOTE — ED PROVIDER NOTE - TOBACCO USE
Steffi Culver)  Surgery; Vascular Surgery  [address] [facility], [address]  [place] [address]  Phone: [phone]  Fax: [phone]  Follow Up Time:     Emir Moran (MD)  Gastroenterology; Internal Medicine; Transplant Hepatology  [address] [place] [address]  Phone: [phone]  Fax: [phone]  Follow Up Time:   
Unknown if ever smoked

## 2024-05-08 NOTE — ED ADULT NURSE NOTE - OBJECTIVE STATEMENT
received patient to intake for flank pain. A&o4, ambulatory, c/o lower abdominal pain/pressure when urinating, radiates to back and nausea. No vomiting or vaginal bleeding. Abdomen soft and nondistended. RR even and unlabored, right 20G AC placed, labs sent. Medicated per MD orders. hx of PCOS, cystitis

## 2024-05-08 NOTE — ED ADULT TRIAGE NOTE - PAIN RATING/NUMBER SCALE (0-10): ACTIVITY
"Subjective:       Patient ID: Jessica Vera is a 77 y.o. female.    Vitals:  height is 5' 3" (1.6 m) and weight is 90.7 kg (199 lb 15.3 oz). Her temperature is 98 °F (36.7 °C). Her blood pressure is 148/65 (abnormal) and her pulse is 93. Her respiration is 14 and oxygen saturation is 97%.     Chief Complaint: Rash    Pt states after finishing nitrofurantoin bid 100mg  she developed a rash that's has been since 09/21/22.Rash is all over her body and itches. It is worse at night. Pt has been taking benadryl for symptoms with mild relief.     Rash  This is a new problem. The current episode started in the past 7 days. The rash is characterized by redness and itchiness. It is unknown if there was an exposure to a precipitant. Associated symptoms include congestion. Pertinent negatives include no shortness of breath, sore throat or vomiting. Past treatments include antihistamine. The treatment provided mild relief.     HENT:  Positive for congestion. Negative for ear pain, ear discharge, foreign body in ear, tinnitus, hearing loss, dental problem, drooling, mouth sores, tongue pain, tongue lesion, facial swelling, facial trauma, nosebleeds, foreign body in nose, postnasal drip, sinus pain, sinus pressure, sore throat, trouble swallowing and voice change.    Cardiovascular: Negative.  Negative for chest pain and passing out.   Respiratory: Negative.  Negative for shortness of breath.    Gastrointestinal: Negative.  Negative for abdominal pain, nausea and vomiting.   Musculoskeletal: Negative.  Negative for pain.   Skin:  Positive for rash. Negative for color change, pale, wound, abrasion, laceration, lesion, skin thickening/induration, puncture wound, erythema, bruising, abscess and avulsion.        Rash to all over body, itchy nonpainful   Neurological: Negative.  Negative for dizziness, light-headedness, numbness, tingling and seizures.   Hematologic/Lymphatic: Negative.      Objective:      Physical Exam "   Constitutional: She is oriented to person, place, and time. She appears well-developed.   HENT:   Head: Normocephalic and atraumatic. Head is without abrasion, without contusion and without laceration.   Ears:   Right Ear: External ear normal.   Left Ear: External ear normal.   Nose: Nose normal. No mucosal edema, rhinorrhea or purulent discharge.   Mouth/Throat: Uvula is midline, oropharynx is clear and moist and mucous membranes are normal. No uvula swelling. No oropharyngeal exudate, posterior oropharyngeal edema, posterior oropharyngeal erythema, tonsillar abscesses or cobblestoning. Tonsils are 1+ on the right. Tonsils are 1+ on the left. No tonsillar exudate.   Eyes: Conjunctivae, EOM and lids are normal. Pupils are equal, round, and reactive to light.   Neck: Trachea normal and phonation normal. Neck supple.   Cardiovascular: Normal rate, regular rhythm and normal heart sounds.   Pulmonary/Chest: Effort normal and breath sounds normal. No stridor. No apnea, no tachypnea and no bradypnea. No respiratory distress. She has no decreased breath sounds. She has no wheezes. She has no rhonchi. She has no rales.   Musculoskeletal: Normal range of motion.         General: Normal range of motion.   Neurological: She is alert and oriented to person, place, and time.   Skin: Skin is warm, dry, intact, not diaphoretic, not pale, rash, urticarial (Pink diffuse urticarial rash  to bilateral upper and lower extremities, nontender no swelling, no drainage, no fluctuance, no induration, lesions very in size from pinpoint, to dime-size), not nodular, not pustular, not purpuric, not macular, not vesicular and not papular. Capillary refill takes less than 2 seconds. No abrasion, No burn, No bruising, No erythema and No ecchymosis        Psychiatric: Her speech is normal and behavior is normal. Judgment and thought content normal.   Nursing note and vitals reviewed.      Assessment:       1. Rash    2. Medication refill           Plan:         Rash  -     Ambulatory referral/consult to Dermatology    Medication refill    Other orders  -     predniSONE (DELTASONE) 20 MG tablet; Take 0.5 tablets (10 mg total) by mouth once daily. for 3 days  Dispense: 2 tablet; Refill: 0  -     ipratropium (ATROVENT) 42 mcg (0.06 %) nasal spray; 2 sprays by Nasal route 3 (three) times daily. for 7 days  Dispense: 15 mL; Refill: 0         Medical Decision Making:   Initial Assessment:   PT in room AAOX4, skin W/D, resp E/U, non toxic in appearance, NAD.   Diffuse pink rash noted to bilateral upper extremities.  Urgent Care Management:   Discussed with patient the patient's rash appears to be an allergic reaction.  Discussed with patient to continue taking Benadryl at night patient have Zyrtec during the day for itching.  Discussed with patient to take steroids prednisone today.  Discussed with patient to follow-up with primary care or Dermatology as a dermatology referral has been placed if this persist.  Discussed with patient strict ED precautions such as development drooling confusion, as well as shortness of breath or unable to swallow pain discussed with patient patient can also take  Pepcid as well to block the histamine reaction.   Patient also requesting refill of Atrovent nose spray as patient has a history a chronic runny nose and reports has ran out of this medication and would like a refill.  I agreed to refill his medicine and instructed patient please follow-up primary care for another refill. Patient agrees with treatment plan and verbalizes understanding patient ambulatory clinic in no acute distress.            8 (severe pain)

## 2024-05-08 NOTE — ED PROVIDER NOTE - PROGRESS NOTE DETAILS
Fellow MD Tom Powell: Patient reevaluate bedside in no acute distress at this time.  Shared decision-making had with the patient and the patient is comfortable with going home following up with her primary care physician and if she is unable to control her pain at home that she will return to the ED for CT abdomen pelvis.  Abdomen is soft and nontender at this time.  Patient feeling better at this time.  Patient is stable for discharge.

## 2024-05-08 NOTE — ED PROVIDER NOTE - ATTENDING CONTRIBUTION TO CARE
agree with above hpi  on my exam  GEN - NAD; nontoxic appearing; A+O x3   HEAD - NC/AT   EYES- PERRL, EOMI  ENT: Airway patent, mmm, Oral cavity and pharynx normal. No inflammation, swelling, exudate, or lesions.  NECK: Neck supple  PULMONARY - CTA b/l, symmetric breath sounds.   CARDIAC -s1s2, RRR, no M,G,R  ABDOMEN - +BS, ND, mild suprapubic llq ttp, soft, no guarding, no rebound, no masses   BACK - no CVA tenderness, Normal  spine   EXTREMITIES - FROM, symmetric pulses, capillary refill < 2 seconds, no edema   SKIN - no rash or bruising   NEUROLOGIC - alert, speech clear, no focal deficits  Plan for labs, ua, culture, US tvus and kidney/bladder, eval for diff including but not limited to infections, ovarian pathology elec dist, organ dysfunction. If initial w/u unrevealing and patient still feeling unwell may need to expand w/u.

## 2024-05-08 NOTE — ED PROVIDER NOTE - NSFOLLOWUPINSTRUCTIONS_ED_ALL_ED_FT
Please follow up with your PMD within 1-2 weeks time.     Return to the ED if symptoms worsen.     Take Acetaminophen 500 mg (if extra strength) or 650 mg (if regular strength) every 4 hours as needed for pain.     Abdominal Pain, Adult  A health care provider talking to a person during a medical exam.  Pain in the abdomen (abdominal pain) can be caused by many things. In most cases, it gets better with no treatment or by being treated at home. But in some cases, it can be serious.    Your health care provider will ask questions about your medical history and do a physical exam to try to figure out what is causing your pain.    Follow these instructions at home:  Medicines    Take over-the-counter and prescription medicines only as told by your provider.  Do not take medicines that help you poop (laxatives) unless told by your provider.  General instructions    Watch your condition for any changes.  Drink enough fluid to keep your pee (urine) pale yellow.  Contact a health care provider if:  Your pain changes, gets worse, or lasts longer than expected.  You have severe cramping or bloating in your abdomen, or you vomit.  Your pain gets worse with meals, after eating, or with certain foods.  You are constipated or have diarrhea for more than 2–3 days.  You are not hungry, or you lose weight without trying.  You have signs of dehydration. These may include:  Dark pee, very little pee, or no pee.  Cracked lips or dry mouth.  Sleepiness or weakness.  You have pain when you pee (urinate) or poop.  Your abdominal pain wakes you up at night.  You have blood in your pee.  You have a fever.  Get help right away if:  You cannot stop vomiting.  Your pain is only in one part of the abdomen. Pain on the right side could be caused by appendicitis.  You have bloody or black poop (stool), or poop that looks like tar.  You have trouble breathing.  You have chest pain.  These symptoms may be an emergency. Get help right away. Call 911.  Do not wait to see if the symptoms will go away.  Do not drive yourself to the hospital.  This information is not intended to replace advice given to you by your health care provider. Make sure you discuss any questions you have with your health care provider.

## 2024-05-08 NOTE — ED PROVIDER NOTE - OBJECTIVE STATEMENT
33-year-old female with past medical history of IBS, fibromyalgia, interstitial cystitis, PCOS, presenting to the ED for evaluation of bilateral flank and lower abdominal pain over the past several days.  As per patient, she states that this is not her normal abdominal pain and it concerned her.  Patient states that it is localized to her lower abdomen as well as flanks.  She reports associated nausea.  She denies any fever, chills, vomiting, diarrhea, dysuria, hematuria, chest pain, shortness of breath, cough, and any additional complaints at this time.  No recent travel or sick contacts. 33-year-old female with past medical history of IBS, fibromyalgia, interstitial cystitis, PCOS, presenting to the ED for evaluation of bilateral flank and lower abdominal pain over the past several days.  As per patient, she states that this is not her normal abdominal pain and it concerned her.  Patient states that it is localized to her lower abdomen as well as flanks.  She reports associated nausea.  No abnl dc or bleeding, sa with one partner. She denies any fever, chills, vomiting, diarrhea, dysuria, hematuria, chest pain, shortness of breath, cough, and any additional complaints at this time.  No recent travel or sick contacts.

## 2024-05-10 ENCOUNTER — APPOINTMENT (OUTPATIENT)
Dept: OPHTHALMOLOGY | Facility: CLINIC | Age: 34
End: 2024-05-10

## 2024-05-10 ENCOUNTER — EMERGENCY (EMERGENCY)
Facility: HOSPITAL | Age: 34
LOS: 1 days | Discharge: HOME | End: 2024-05-10
Admitting: EMERGENCY MEDICINE
Payer: COMMERCIAL

## 2024-05-10 VITALS
RESPIRATION RATE: 18 BRPM | TEMPERATURE: 99 F | SYSTOLIC BLOOD PRESSURE: 128 MMHG | DIASTOLIC BLOOD PRESSURE: 79 MMHG | HEART RATE: 80 BPM | OXYGEN SATURATION: 98 %

## 2024-05-10 LAB
ALBUMIN SERPL ELPH-MCNC: 4.5 G/DL — SIGNIFICANT CHANGE UP (ref 3.3–5)
ALP SERPL-CCNC: 60 U/L — SIGNIFICANT CHANGE UP (ref 40–120)
ALT FLD-CCNC: 16 U/L — SIGNIFICANT CHANGE UP (ref 4–33)
ANION GAP SERPL CALC-SCNC: 13 MMOL/L — SIGNIFICANT CHANGE UP (ref 7–14)
APPEARANCE UR: CLEAR — SIGNIFICANT CHANGE UP
AST SERPL-CCNC: 17 U/L — SIGNIFICANT CHANGE UP (ref 4–32)
BASOPHILS # BLD AUTO: 0.03 K/UL — SIGNIFICANT CHANGE UP (ref 0–0.2)
BASOPHILS NFR BLD AUTO: 0.3 % — SIGNIFICANT CHANGE UP (ref 0–2)
BILIRUB SERPL-MCNC: 0.3 MG/DL — SIGNIFICANT CHANGE UP (ref 0.2–1.2)
BILIRUB UR-MCNC: NEGATIVE — SIGNIFICANT CHANGE UP
BUN SERPL-MCNC: 12 MG/DL — SIGNIFICANT CHANGE UP (ref 7–23)
CALCIUM SERPL-MCNC: 9.5 MG/DL — SIGNIFICANT CHANGE UP (ref 8.4–10.5)
CHLORIDE SERPL-SCNC: 103 MMOL/L — SIGNIFICANT CHANGE UP (ref 98–107)
CO2 SERPL-SCNC: 21 MMOL/L — LOW (ref 22–31)
COLOR SPEC: YELLOW — SIGNIFICANT CHANGE UP
CREAT SERPL-MCNC: 0.69 MG/DL — SIGNIFICANT CHANGE UP (ref 0.5–1.3)
CULTURE RESULTS: SIGNIFICANT CHANGE UP
DIFF PNL FLD: NEGATIVE — SIGNIFICANT CHANGE UP
EGFR: 118 ML/MIN/1.73M2 — SIGNIFICANT CHANGE UP
EOSINOPHIL # BLD AUTO: 0.04 K/UL — SIGNIFICANT CHANGE UP (ref 0–0.5)
EOSINOPHIL NFR BLD AUTO: 0.4 % — SIGNIFICANT CHANGE UP (ref 0–6)
GLUCOSE SERPL-MCNC: 88 MG/DL — SIGNIFICANT CHANGE UP (ref 70–99)
GLUCOSE UR QL: NEGATIVE MG/DL — SIGNIFICANT CHANGE UP
HCT VFR BLD CALC: 41.9 % — SIGNIFICANT CHANGE UP (ref 34.5–45)
HGB BLD-MCNC: 14.5 G/DL — SIGNIFICANT CHANGE UP (ref 11.5–15.5)
IANC: 6.15 K/UL — SIGNIFICANT CHANGE UP (ref 1.8–7.4)
IMM GRANULOCYTES NFR BLD AUTO: 0.4 % — SIGNIFICANT CHANGE UP (ref 0–0.9)
KETONES UR-MCNC: NEGATIVE MG/DL — SIGNIFICANT CHANGE UP
LEUKOCYTE ESTERASE UR-ACNC: NEGATIVE — SIGNIFICANT CHANGE UP
LIDOCAIN IGE QN: 33 U/L — SIGNIFICANT CHANGE UP (ref 7–60)
LYMPHOCYTES # BLD AUTO: 2.72 K/UL — SIGNIFICANT CHANGE UP (ref 1–3.3)
LYMPHOCYTES # BLD AUTO: 28.2 % — SIGNIFICANT CHANGE UP (ref 13–44)
MCHC RBC-ENTMCNC: 30.1 PG — SIGNIFICANT CHANGE UP (ref 27–34)
MCHC RBC-ENTMCNC: 34.6 GM/DL — SIGNIFICANT CHANGE UP (ref 32–36)
MCV RBC AUTO: 87.1 FL — SIGNIFICANT CHANGE UP (ref 80–100)
MONOCYTES # BLD AUTO: 0.68 K/UL — SIGNIFICANT CHANGE UP (ref 0–0.9)
MONOCYTES NFR BLD AUTO: 7 % — SIGNIFICANT CHANGE UP (ref 2–14)
NEUTROPHILS # BLD AUTO: 6.15 K/UL — SIGNIFICANT CHANGE UP (ref 1.8–7.4)
NEUTROPHILS NFR BLD AUTO: 63.7 % — SIGNIFICANT CHANGE UP (ref 43–77)
NITRITE UR-MCNC: NEGATIVE — SIGNIFICANT CHANGE UP
NRBC # BLD: 0 /100 WBCS — SIGNIFICANT CHANGE UP (ref 0–0)
NRBC # FLD: 0 K/UL — SIGNIFICANT CHANGE UP (ref 0–0)
PH UR: 6 — SIGNIFICANT CHANGE UP (ref 5–8)
PLATELET # BLD AUTO: 322 K/UL — SIGNIFICANT CHANGE UP (ref 150–400)
POTASSIUM SERPL-MCNC: 4.3 MMOL/L — SIGNIFICANT CHANGE UP (ref 3.5–5.3)
POTASSIUM SERPL-SCNC: 4.3 MMOL/L — SIGNIFICANT CHANGE UP (ref 3.5–5.3)
PROT SERPL-MCNC: 7.2 G/DL — SIGNIFICANT CHANGE UP (ref 6–8.3)
PROT UR-MCNC: NEGATIVE MG/DL — SIGNIFICANT CHANGE UP
RBC # BLD: 4.81 M/UL — SIGNIFICANT CHANGE UP (ref 3.8–5.2)
RBC # FLD: 12.2 % — SIGNIFICANT CHANGE UP (ref 10.3–14.5)
SODIUM SERPL-SCNC: 137 MMOL/L — SIGNIFICANT CHANGE UP (ref 135–145)
SP GR SPEC: 1.01 — SIGNIFICANT CHANGE UP (ref 1–1.03)
SPECIMEN SOURCE: SIGNIFICANT CHANGE UP
UROBILINOGEN FLD QL: 0.2 MG/DL — SIGNIFICANT CHANGE UP (ref 0.2–1)
WBC # BLD: 9.66 K/UL — SIGNIFICANT CHANGE UP (ref 3.8–10.5)
WBC # FLD AUTO: 9.66 K/UL — SIGNIFICANT CHANGE UP (ref 3.8–10.5)

## 2024-05-10 PROCEDURE — 99285 EMERGENCY DEPT VISIT HI MDM: CPT

## 2024-05-10 PROCEDURE — 74177 CT ABD & PELVIS W/CONTRAST: CPT | Mod: 26,MC

## 2024-05-10 RX ORDER — ONDANSETRON 8 MG/1
4 TABLET, FILM COATED ORAL ONCE
Refills: 0 | Status: COMPLETED | OUTPATIENT
Start: 2024-05-10 | End: 2024-05-10

## 2024-05-10 NOTE — ED ADULT TRIAGE NOTE - CHIEF COMPLAINT QUOTE
Pt presents to ED ambulatory from home with c/o abdominal pain x 1 week. Pt was evaluated in ED approx 2 days ago and advised to come to ED if symptoms persist. Pt reports dysuria, denies burning or foul odor.

## 2024-05-10 NOTE — ED ADULT NURSE NOTE - OBJECTIVE STATEMENT
Patient received wellness, a&ox4, ambulatory. Pt presents to ED ambulatory from home with c/o abdominal pain x 1 week, radiating to flank area, chills, and dysuria. Pt reports having "interstitial cystis." Pt was seen in ED 2 days and was advised to come back if symptoms worsened. Pt denies chest pain, sob, n+v, headache, dizziness. Breathing even, unlabored. Pending AYO montero.

## 2024-05-10 NOTE — ED PROVIDER NOTE - NSFOLLOWUPINSTRUCTIONS_ED_ALL_ED_FT
14-Apr-2024 18:01
Follow up with your PMD within 1-2 days or you can call our clinic at [phone] for an appointment  Take all of your other medications as previously prescribed.  Worsening, continued or ANY new concerning symptoms return to the Emergency Department.     Abdominal Pain, Adult  A health care provider talking to a person during a medical exam.  Pain in the abdomen (abdominal pain) can be caused by many things. In most cases, it gets better with no treatment or by being treated at home. But in some cases, it can be serious.    Your health care provider will ask questions about your medical history and do a physical exam to try to figure out what is causing your pain.    Follow these instructions at home:  Medicines    Take over-the-counter and prescription medicines only as told by your provider.  Do not take medicines that help you poop (laxatives) unless told by your provider.  General instructions    Watch your condition for any changes.  Drink enough fluid to keep your pee (urine) pale yellow.  Contact a health care provider if:  Your pain changes, gets worse, or lasts longer than expected.  You have severe cramping or bloating in your abdomen, or you vomit.  Your pain gets worse with meals, after eating, or with certain foods.  You are constipated or have diarrhea for more than 2–3 days.  You are not hungry, or you lose weight without trying.  You have signs of dehydration. These may include:  Dark pee, very little pee, or no pee.  Cracked lips or dry mouth.  Sleepiness or weakness.  You have pain when you pee (urinate) or poop.  Your abdominal pain wakes you up at night.  You have blood in your pee.  You have a fever.  Get help right away if:  You cannot stop vomiting.  Your pain is only in one part of the abdomen. Pain on the right side could be caused by appendicitis.  You have bloody or black poop (stool), or poop that looks like tar.  You have trouble breathing.  You have chest pain.  These symptoms may be an emergency. Get help right away. Call 911.  Do not wait to see if the symptoms will go away.  Do not drive yourself to the hospital.  This information is not intended to replace advice given to you by your health care provider. Make sure you discuss any questions you have with your health care provider.

## 2024-05-10 NOTE — ED PROVIDER NOTE - PATIENT PORTAL LINK FT
You can access the FollowMyHealth Patient Portal offered by [facility] by registering at the following website: [email or web address]. By joining [employer]’s FollowMyHealth portal, you will also be able to view your health information using other applications (apps) compatible with our system.

## 2024-05-10 NOTE — ED ADULT NURSE NOTE - NSFALLUNIVINTERV_ED_ALL_ED
Bed/Stretcher in lowest position, wheels locked, appropriate side rails in place/Call bell, personal items and telephone in reach/Instruct patient to call for assistance before getting out of bed/chair/stretcher/Non-slip footwear applied when patient is off stretcher/[place] to call system/Physically safe environment - no spills, clutter or unnecessary equipment/Purposeful proactive rounding/Room/bathroom lighting operational, light cord in reach

## 2024-05-10 NOTE — ED PROVIDER NOTE - NS ED ATTENDING NAME FT
Dr. Odell
c/o mild discomfort , decreased hearing to Rt ear x 2 days states " might have pushed Q tips too  far " when cleaning ear yesterday

## 2024-05-10 NOTE — ED PROVIDER NOTE - OBJECTIVE STATEMENT
32 y/o F with no pmh pw abdominal pain, dysuria, 33-year-old female with past medical history of Depression, IBS, fibromyalgia, interstitial cystitis, PCOS, presenting to the ED for generalized lower abdominal and lower back pain assoc with nausea and urinary frequency. Seen here 2 days ago in which labs were stable, UCX neg. Renal US and TVUS without acute findings.  Patient is sexually active with one partner. Denies vaginal discharge. Denies f/c, headache, CP, SOB, vomiting, diarrhea, dysuria, hematuria, weakness.

## 2024-05-10 NOTE — ED POST DISCHARGE NOTE - REASON FOR FOLLOW-UP
Pt contacted ED to follow up UCX result.  UCX < 10,000 normal urogenital ann-marie.  Pt has a follow up appt today at 11am with PMD Dr. Pavon [phone].  Results faxed to office [phone].  Fax confirmation sheet confirmed.  Discussed with patient to return to the ED for any worsening of symptoms. Other

## 2024-05-10 NOTE — ED PROVIDER NOTE - CLINICAL SUMMARY MEDICAL DECISION MAKING FREE TEXT BOX
Abdominal pain, Dysuria   Plan: Will draw basic labs, check UA/UCX, give pain medication, antiemetics, fluids and reassess 33-year-old female with past medical history of Depression, IBS, fibromyalgia, interstitial cystitis, PCOS, presenting to the ED for generalized lower abdominal and lower back pain assoc with nausea and urinary frequency. Seen here 2 days ago in which labs were stable, UCX neg. Renal US and TVUS without acute findings.  Patient is sexually active with one partner. Denies vaginal discharge. Denies f/c, headache, CP, SOB, vomiting, diarrhea, dysuria, hematuria, weakness.   PE: Abd- soft, nontender, no rebound tenderness, no guarding. Neg Delcid. Neg Psoas. No CVAT  A/P- Persistent ab pain with normal previous w/u- will draw basic labs, recheck UA/UCX, UCG, check CT ab/pelvis, antiemetics, and reassess 33-year-old female with past medical history of Depression, IBS, fibromyalgia, interstitial cystitis, PCOS, presenting to the ED for generalized lower abdominal and lower back pain assoc with nausea and urinary frequency. Seen here 2 days ago in which labs were stable, UCX neg. Renal US and TVUS without acute findings.  Patient is sexually active with one partner. Denies vaginal discharge. Denies f/c, headache, CP, SOB, vomiting, diarrhea, dysuria, hematuria, weakness.   PE: Abd- soft, nontender, no rebound tenderness, no guarding. Neg Delcid. Neg Psoas. No CVAT  A/P- Persistent ab pain with normal previous w/u- will draw basic labs, recheck UA/UCX, UCG, check CT ab/pelvis, antiemetics, and reassess  **update: labs stable. UA neg. CT neg.  Patient to f/u with PCP. Strict ED return precautions discussed. Patient understands and agrees.

## 2024-05-12 LAB
CULTURE RESULTS: SIGNIFICANT CHANGE UP
SPECIMEN SOURCE: SIGNIFICANT CHANGE UP

## 2024-05-31 ENCOUNTER — APPOINTMENT (OUTPATIENT)
Dept: PODIATRY | Facility: CLINIC | Age: 34
End: 2024-05-31
Payer: COMMERCIAL

## 2024-05-31 DIAGNOSIS — M79.673 PAIN IN UNSPECIFIED FOOT: ICD-10-CM

## 2024-05-31 DIAGNOSIS — M72.2 PLANTAR FASCIAL FIBROMATOSIS: ICD-10-CM

## 2024-05-31 DIAGNOSIS — M76.821 POSTERIOR TIBIAL TENDINITIS, RIGHT LEG: ICD-10-CM

## 2024-05-31 PROCEDURE — 99213 OFFICE O/P EST LOW 20 MIN: CPT

## 2024-06-03 PROBLEM — M76.821 POSTERIOR TIBIAL TENDON DYSFUNCTION (PTTD) OF RIGHT LOWER EXTREMITY: Status: ACTIVE | Noted: 2024-05-31

## 2024-06-03 PROBLEM — M72.2 PLANTAR FASCIITIS: Status: ACTIVE | Noted: 2022-11-02

## 2024-06-04 PROBLEM — M79.673 PAIN, FOOT: Status: ACTIVE | Noted: 2024-06-03

## 2024-06-04 NOTE — PHYSICAL EXAM
[2+] : left foot dorsalis pedis 2+ [Sensation] : the sensory exam was normal to light touch and pinprick [No Focal Deficits] : no focal deficits [Deep Tendon Reflexes (DTR)] : deep tendon reflexes were 2+ and symmetric [Motor Exam] : the motor exam was normal [Ankle Swelling (On Exam)] : not present [Varicose Veins Of Lower Extremities] : not present [Delayed in the Right Toes] : capillary refills normal in right toes [Delayed in the Left Toes] : capillary refills normal in the left toes [FreeTextEntry3] : Hair growth noted on digits. Proximal to distal cooling is within normal limits.  [de-identified] : She has right PT tendonitis. It is not swollen or inflamed but early signs of symptomatology. No pain with resisted inversion.

## 2024-06-04 NOTE — ASSESSMENT
[FreeTextEntry1] : Impression: PT tendonitis. Plantar fasciitis. Pain, right.  Treatment: I gave her a series of stretching exercises. I want her to get Powerstep arch supports. I discussed appropriate shoe gear. I wrote a prescription for physical therapy. She will follow-up in the office with continued pain that persists.

## 2024-06-04 NOTE — HISTORY OF PRESENT ILLNESS
[FreeTextEntry1] : Patient presents today because with pain of both feet. She has this fasciitis that overall is doing well with the Hoka's, but it is vaguely painful on the right.

## 2024-06-13 ENCOUNTER — NON-APPOINTMENT (OUTPATIENT)
Age: 34
End: 2024-06-13

## 2024-06-13 ENCOUNTER — APPOINTMENT (OUTPATIENT)
Dept: OPHTHALMOLOGY | Facility: CLINIC | Age: 34
End: 2024-06-13
Payer: COMMERCIAL

## 2024-06-13 PROCEDURE — 92083 EXTENDED VISUAL FIELD XM: CPT

## 2024-06-13 PROCEDURE — 92060 SENSORIMOTOR EXAMINATION: CPT

## 2024-06-13 PROCEDURE — 92250 FUNDUS PHOTOGRAPHY W/I&R: CPT

## 2024-06-13 PROCEDURE — 99204 OFFICE O/P NEW MOD 45 MIN: CPT

## 2024-08-14 ENCOUNTER — APPOINTMENT (OUTPATIENT)
Dept: OPHTHALMOLOGY | Facility: CLINIC | Age: 34
End: 2024-08-14

## 2024-08-20 NOTE — ED PROVIDER NOTE - NS_EDPROVIDERDISPOUSERTYPE_ED_A_ED
asthma/Medications
I have personally evaluated and examined the patient. The Attending was available to me as a supervising provider if needed.

## 2024-09-19 ENCOUNTER — APPOINTMENT (OUTPATIENT)
Dept: OPHTHALMOLOGY | Facility: CLINIC | Age: 34
End: 2024-09-19
Payer: COMMERCIAL

## 2024-09-19 ENCOUNTER — NON-APPOINTMENT (OUTPATIENT)
Age: 34
End: 2024-09-19

## 2024-09-19 PROCEDURE — 92014 COMPRE OPH EXAM EST PT 1/>: CPT
